# Patient Record
Sex: FEMALE
[De-identification: names, ages, dates, MRNs, and addresses within clinical notes are randomized per-mention and may not be internally consistent; named-entity substitution may affect disease eponyms.]

---

## 2018-10-09 ENCOUNTER — HOSPITAL ENCOUNTER (EMERGENCY)
Dept: HOSPITAL 14 - H.ER | Age: 58
Discharge: HOME | End: 2018-10-09
Payer: COMMERCIAL

## 2018-10-09 VITALS — SYSTOLIC BLOOD PRESSURE: 144 MMHG | DIASTOLIC BLOOD PRESSURE: 79 MMHG | HEART RATE: 81 BPM | RESPIRATION RATE: 17 BRPM

## 2018-10-09 VITALS — TEMPERATURE: 98.2 F

## 2018-10-09 VITALS — OXYGEN SATURATION: 99 %

## 2018-10-09 DIAGNOSIS — I10: ICD-10-CM

## 2018-10-09 DIAGNOSIS — M54.30: Primary | ICD-10-CM

## 2018-10-09 LAB
ALBUMIN SERPL-MCNC: 4.5 G/DL (ref 3.5–5)
ALBUMIN/GLOB SERPL: 1.2 {RATIO} (ref 1–2.1)
ALT SERPL-CCNC: 31 U/L (ref 9–52)
APTT BLD: 31.9 SECONDS (ref 25.6–37.1)
AST SERPL-CCNC: 31 U/L (ref 14–36)
BASOPHILS # BLD AUTO: 0 K/UL (ref 0–0.2)
BASOPHILS NFR BLD: 0.4 % (ref 0–2)
BUN SERPL-MCNC: 13 MG/DL (ref 7–17)
CALCIUM SERPL-MCNC: 9.4 MG/DL (ref 8.4–10.2)
EOSINOPHIL # BLD AUTO: 0.2 K/UL (ref 0–0.7)
EOSINOPHIL NFR BLD: 1.5 % (ref 0–4)
ERYTHROCYTE [DISTWIDTH] IN BLOOD BY AUTOMATED COUNT: 13.2 % (ref 11.5–14.5)
GFR NON-AFRICAN AMERICAN: > 60
HDLC SERPL-MCNC: 41 MG/DL (ref 30–70)
HGB BLD-MCNC: 14.4 G/DL (ref 12–16)
INR PPP: 1
LDLC SERPL-MCNC: 138 MG/DL (ref 0–129)
LYMPHOCYTES # BLD AUTO: 3.6 K/UL (ref 1–4.3)
LYMPHOCYTES NFR BLD AUTO: 36 % (ref 20–40)
MCH RBC QN AUTO: 31.9 PG (ref 27–31)
MCHC RBC AUTO-ENTMCNC: 34.4 G/DL (ref 33–37)
MCV RBC AUTO: 92.9 FL (ref 81–99)
MONOCYTES # BLD: 0.5 K/UL (ref 0–0.8)
MONOCYTES NFR BLD: 4.8 % (ref 0–10)
NEUTROPHILS # BLD: 5.7 K/UL (ref 1.8–7)
NEUTROPHILS NFR BLD AUTO: 57.3 % (ref 50–75)
NRBC BLD AUTO-RTO: 0.1 % (ref 0–0)
PLATELET # BLD: 262 K/UL (ref 130–400)
PMV BLD AUTO: 8.2 FL (ref 7.2–11.7)
PROTHROMBIN TIME: 11.3 SECONDS (ref 9.8–13.1)
RBC # BLD AUTO: 4.51 MIL/UL (ref 3.8–5.2)
WBC # BLD AUTO: 10 K/UL (ref 4.8–10.8)

## 2018-10-09 NOTE — CT
Date of service: 



10/09/2018



PROCEDURE:  CT HEAD WITHOUT CONTRAST.



HISTORY:

headache



COMPARISON:

10/31/2014



TECHNIQUE:

Axial computed tomography images were obtained through the head/brain 

without intravenous contrast.  



Supplemental Coronal and Sagittal projections created and reviewed.



Radiation dose:



Total exam DLP = 763.88 mGy-cm.



This CT exam was performed using one or more of the following dose 

reduction techniques: Automated exposure control, adjustment of the 

mA and/or kV according to patient size, and/or use of iterative 

reconstruction technique.



FINDINGS:



HEMORRHAGE:

No intracranial hemorrhage. 



BRAIN:

No mass effect or edema.  No atrophy or chronic microvascular 

ischemic changes.



VENTRICLES:

Unremarkable. No hydrocephalus. 



CALVARIUM:

Unremarkable.



PARANASAL SINUSES:

Unremarkable as visualized. No significant inflammatory changes.



MASTOID AIR CELLS:

Unremarkable as visualized. No inflammatory changes.



OTHER FINDINGS:

None.



IMPRESSION:

No acute intracranial abnormalities. No significant findings to 

account for the clinical presentation.  No significant interval 

change compared to the prior examination(s).

## 2018-10-09 NOTE — ED PDOC
- Laboratory Results


Result Diagrams: 


                                 10/09/18 18:43





                                 10/09/18 18:43





- ECG


O2 Sat by Pulse Oximetry: 99





- Progress


ED Course And Treament: 





Case endorsed to writer from Mikhail PAULSON pending re-eval





Clonidine 0.2mg ordered for elevated BP after Labetalol given





22:45


Patient resting comfortably; states she is feeling better and is ready to go 

home


Patient reports no leg pain/numbness.  BP improved


Case discussed with ED attending Dr. Pritchett, who agrees with plan to d/c and 

f/up outpatient


Patient educated on findings, discharged with rx Naproxen


Advised follow up PMD within 2-3 days


Advised lifestyle modification for elevated BP


Return precautions given





Patient demonstrates full understanding of discharge instructions


Patient requires no further intervention in the ED and is stable for discharge 

at this time 





Disposition





- Clinical Impression


Clinical Impression: 


 Sciatica, Hypertension








- POA


Present On Arrival: None





- Disposition


Referrals: 


 Service [Outside]


Disposition: Routine/Home


Disposition Time: 22:57


Condition: IMPROVED


Additional Instructions: 


Take flexeril every 8 hours as needed for pain


Take ibuprofen or tylenol for pain


Practice stretching


Apply heat to painful area


Followup with primary doctor or clinic within 2 days for BP check


Return to ED if symptoms persist or worsen


Prescriptions: 


Cyclobenzaprine [Cyclobenzaprine HCl] 10 mg PO Q8H PRN #21 tab


 PRN Reason: spasm


Naproxen [Naprosyn] 500 mg PO Q12 PRN #20 tablet


 PRN Reason: Pain, Moderate (4-7)


Instructions:  Sciatica, High Blood Pressure in Adults, Controlling Your Blood 

Pressure Through Lifestyle


Forms:  [facility] (English), [facility] ED School/Work Excuse

## 2018-10-09 NOTE — US
Date of service: 



10/09/2018



HISTORY:

 left thigh heaviness, numbness, pain.  



PRIORS:

None. 



FINDINGS:

2-D, color and duplex Doppler analysis of the lower extremity venous 

circulation using routine protocol involving the left common and 

superficial femoral as well as popliteal veins.



Venous compressibility: Normal. 



Flow and augmentation patterns: Normal. 



Visualized veins upper third of calf: Normal. 



Baker cyst: None. 



IMPRESSION:

No sonographic or Doppler evidence for DVT in left lower extremity.

## 2018-10-09 NOTE — ED PDOC
Lower Extremity Pain/Injury


Time Seen by Provider: 10/09/18 14:59


Chief Complaint (Nursing): Lower Extremity Problem/Injury


Additional Complaint(s): 





59 y/o with PMH of varicose veins presents to the ED c/o worsening left leg 

heaviness and pain x 1 day. Pt states that starting last night, her left leg has

felt heavy, stiff, and numb with tenderness to the calf and inner thigh. Pain is

worse with movement. No h/o malignancy or DVT/PE, recent travel or long 

immobilizations, no recent surgery, no estrogen use. Denies injury, calf or leg 

edema, hip pain, back pain, neck pain, symptoms to right leg, difficulty 

walking, SOB, cough, chest pain, weakness, difficulty speaking, vision changes, 

lightheadedness, rash, urinary symptoms, bowel/bladder incontinence.





NIHSS Stroke Scale





- Date/Time Evaluation Performed


Date Performed: 10/09/18


Time Performed: 18:43





- How Severe is the Stroke


Level of Consciousness: 0=Alert


LOC to Questions: 0=Both comments correct


LOC to commands: 0=Obeys both correctly


Best Gaze: 0=Normal


Visual: 0=No visual loss


Facial: 0=Normal


Motor Arm - Left: 0=No drift


Motor Arm - Right: 0=No drift


Motor Leg - Left: 0=No drift


Motor Leg - Right: 0=No drift


Limb Ataxia: 0=Absent


Sensory: 0=Normal


Best Language: 0=No aphasia


Dysarthia: 0=Normal articulation


Extinction & Inattention (Neglect): 0=Normal, no object


Score: 0





Past Medical History


Reviewed: Historical Data, Nursing Documentation, Vital Signs


Vital Signs: 





                                Last Vital Signs











Temp  98.8 F   10/09/18 14:06


 


Pulse  98 H  10/09/18 14:06


 


Resp  20   10/09/18 14:06


 


BP  189/102 H  10/09/18 14:06


 


Pulse Ox  98   10/09/18 14:06














- Family History


Family History: States: Stroke





- Home Medications


Home Medications: 


                                Ambulatory Orders











 Medication  Instructions  Recorded


 


Acetaminophen/Hydrocodone Bi 1 tab PO QID PRN #10 tab 10/31/14





[Vicodin 300 mg-5 mg]  


 


Acetaminophen/Oxycodone Hydr 1 tab PO QID #12 tab 10/31/14





[Percocet 325 mg-5 mg]  


 


Cyclobenzaprine [Cyclobenzaprine 10 mg PO Q8H PRN #21 tab 10/09/18





HCl]  














- Allergies


Allergies/Adverse Reactions: 


                                    Allergies











Allergy/AdvReac Type Severity Reaction Status Date / Time


 


No Known Allergies Allergy   Verified 10/31/14 12:50














Wells Criteria for PE





- Wells Criteria for Pulmonary Embolism


Clinical Signs and Symptoms of DVT: Yes


P.E is #1 Diagnosis, or Equally Likely: No


Heart Rate >100: No


Immobilization at least 3 days;Surgery previous 4 weeks: No


Previous, objectively diagnosed PE or DVT: No


Hemoptysis: No


Malignancy w/treatment within 6 months, or palliative: No


Total Score: 3





Review of Systems


ROS Statement: Except As Marked, All Systems Reviewed And Found Negative


Constitutional: Negative for: Fever, Chills


Eyes: Negative for: Vision Change


Cardiovascular: Negative for: Chest Pain, Palpitations


Respiratory: Negative for: Cough, Shortness of Breath, Hemoptysis, Pleuritic 

Pain


Gastrointestinal: Negative for: Nausea, Vomiting, Abdominal Pain


Genitourinary Female: Negative for: Dysuria, Frequency, Incontinence, Hematuria,

Vaginal Discharge, Vaginal Bleeding, Pelvic Pain


Musculoskeletal: Positive for: Leg Pain (left).  Negative for: Neck Pain, 

Shoulder Pain, Arm Pain, Back Pain, Hand Pain, Foot Pain


Skin: Negative for: Rash


Neurological: Positive for: Numbness (left anterior thigh), Headache (mild, 

frontal).  Negative for: Weakness, Incoordination, Change in Speech, Confusion, 

Seizures, Altered Mental Status, Dizziness





Physical Exam





- Reviewed


Nursing Documentation Reviewed: Yes





- Physical Exam


Appears: Positive for: Well, Non-toxic, No Acute Distress


Head Exam: Positive for: ATRAUMATIC, NORMAL INSPECTION, NORMOCEPHALIC


Skin: Positive for: Normal Color, Warm, DRY


Eye Exam: Positive for: EOMI, Normal appearance, PERRL


ENT: Positive for: Normal ENT Inspection


Neck: Positive for: Normal, Painless ROM


Cardiovascular/Chest: Positive for: Regular Rate, Rhythm


Respiratory: Positive for: CNT, Normal Breath Sounds


Pulses-Dorsalis Pedis (L): 2+


Pulses-Dorsalis Pedis (R): 2+


Gastrointestinal/Abdominal: Positive for: Normal Exam, Soft


Back: Positive for: Normal Inspection.  Negative for: Vertebral Tenderness, 

Decreased ROM


Extremity: Positive for: Normal ROM, Tenderness (left medial thigh), Calf 

Tenderness (left ), Capillary Refill (normal b/l).  Negative for: Pedal Edema, 

Deformity, Swelling


Neurologic/Psych: Positive for: Alert, CNs II-XII (intact), Oriented, 

Motor/Sensory Deficits (decreased sensation over left thigh), Cerebellar Tests 

(normal), Gait (normal), Aphasia (none).  Negative for: Facial Droop





- Laboratory Results


Result Diagrams: 


                                 10/09/18 18:43





                                 10/09/18 18:43





- ECG


ECG: Positive for: Interpreted By Me, Viewed By Me


ECG Rhythm: Positive for: Normal QRS, Normal ST Segment, Sinus Rhythm


O2 Sat by Pulse Oximetry: 98





Medical Decision Making


Medical Decision Makin y/o with PMH of HTN and varicose veins presents to the ED c/o worsening left 

leg heaviness and pain x 1 day. Pt states that starting last night, her left leg

has felt heavy, stiff, and numb with tenderness to the calf and inner thigh. 

Pain is worse with movement. No recent travel or long immobilizations, no h/o 

malignancy or DVT/PE, no recent surgery, no estrogen use. Denies injury, calf or

leg edema, hip pain, back pain, neck pain, symptoms to right leg, difficulty 

walking, SOB, cough, chest pain, weakness, difficulty speaking, vision changes, 

lightheadedness, rash.





Initial plan:


--tylenol for headache


--left lower extremity duplex vein; r/o DVT





Duplex negative


Reports decreased pain after tylenol





Upon reassessment, pt found to have elevated BP in both arms, 226/118.


Will continue to monitor pressure.





Dr. Kearney evaluated pt, advises workup for stroke with possible observation 

stay to r/o infarction and monitor BP.





plan:


--head CT w/o contrast


--EKG


--CBC, CMP, Lipid Panel, Coags


--Labetalol 10mg IV








Head CT:


FINDINGS:





HEMORRHAGE:


No intracranial hemorrhage. 





BRAIN:


No mass effect or edema.  No atrophy or chronic microvascular ischemic changes.





VENTRICLES:


Unremarkable. No hydrocephalus. 





CALVARIUM:


Unremarkable.





PARANASAL SINUSES:


Unremarkable as visualized. No significant inflammatory changes.





MASTOID AIR CELLS:


Unremarkable as visualized. No inflammatory changes.





OTHER FINDINGS:


None.





IMPRESSION:


No acute intracranial abnormalities. No significant findings to account for the 

clinical presentation.  No significant interval change compared to the prior 

examination(s).





EKG: rate 74, normal sinus rhythm, no ST elevations or T wave depressions


Lipids: elevated


CBC: wnl


CMP: wnl





will sign pt out to Jazzmine Gibbs PA-C for continued BP monitoring and final 

disposition











Disposition





- Clinical Impression


Clinical Impression: 


 Sciatica








- Patient ED Disposition


Is Patient to be Admitted: Transfer of Care





- Disposition


Referrals: 


Elena Dillon MD [Staff Provider] - 


Disposition: Transfer of Care


Disposition Time: 20:00


Condition: IMPROVED


Additional Instructions: 


Take flexeril every 8 hours as needed for pain


Take ibuprofen or tylenol for pain


Practice stretching


Apply heat to painful area


Followup with primary doctor or clinic within 2 days for BP check


Return to ED if symptoms persist or worsen


Prescriptions: 


Cyclobenzaprine [Cyclobenzaprine HCl] 10 mg PO Q8H PRN #21 tab


 PRN Reason: spasm


Instructions:  Sciatica


Forms:  [facility] (English), [facility] ED School/Work Excuse


Patient Signed Over To: Jazzmine Gibbs


Handoff Comments: monitor BP, possible admit to obs

## 2018-10-10 NOTE — CARD
--------------- APPROVED REPORT --------------





Date of service: 10/09/2018



EKG Measurement

Heart [id number]

[id number]

[id number]

[id number]

[id number]



<Conclusion>

Normal sinus rhythm

Nonspecific ST abnormality

Abnormal ECG